# Patient Record
Sex: FEMALE | Race: BLACK OR AFRICAN AMERICAN | URBAN - METROPOLITAN AREA
[De-identification: names, ages, dates, MRNs, and addresses within clinical notes are randomized per-mention and may not be internally consistent; named-entity substitution may affect disease eponyms.]

---

## 2021-09-19 ENCOUNTER — TELEPHONE ENCOUNTER (OUTPATIENT)
Dept: URBAN - METROPOLITAN AREA CLINIC 74 | Facility: CLINIC | Age: 81
End: 2021-09-19

## 2021-09-19 ENCOUNTER — OUT OF OFFICE VISIT (OUTPATIENT)
Dept: URBAN - METROPOLITAN AREA MEDICAL CENTER 9 | Facility: MEDICAL CENTER | Age: 81
End: 2021-09-19
Payer: MEDICARE

## 2021-09-19 DIAGNOSIS — K86.89 ACUTE PANCREATIC FLUID COLLECTION: ICD-10-CM

## 2021-09-19 DIAGNOSIS — D64.89 ANEMIA DUE TO OTHER CAUSE: ICD-10-CM

## 2021-09-19 PROCEDURE — 99222 1ST HOSP IP/OBS MODERATE 55: CPT | Performed by: INTERNAL MEDICINE

## 2021-09-19 PROCEDURE — G8427 DOCREV CUR MEDS BY ELIG CLIN: HCPCS | Performed by: INTERNAL MEDICINE

## 2021-09-20 ENCOUNTER — OUT OF OFFICE VISIT (OUTPATIENT)
Dept: URBAN - METROPOLITAN AREA MEDICAL CENTER 9 | Facility: MEDICAL CENTER | Age: 81
End: 2021-09-20
Payer: MEDICARE

## 2021-09-20 DIAGNOSIS — K21.9 ACID REFLUX: ICD-10-CM

## 2021-09-20 DIAGNOSIS — K59.09 CHANGE IN BOWEL MOVEMENTS INTERMITTENT CONSTIPATION. URGENCY IN THE MORNING.: ICD-10-CM

## 2021-09-20 DIAGNOSIS — K86.89 ACUTE PANCREATIC FLUID COLLECTION: ICD-10-CM

## 2021-09-20 PROCEDURE — 99233 SBSQ HOSP IP/OBS HIGH 50: CPT | Performed by: INTERNAL MEDICINE

## 2021-09-23 ENCOUNTER — OFFICE VISIT (OUTPATIENT)
Dept: URBAN - METROPOLITAN AREA CLINIC 40 | Facility: CLINIC | Age: 81
End: 2021-09-23

## 2021-09-23 NOTE — HPI-TODAY'S VISIT:
81 yo female here for f/u after being discharged yesterday for abnormal CT. some fullness in te pancreatic head noted. was in the hospital with SDH after recent fall. plavix has been held. has h/o CAD s/p CABG.

## 2021-10-11 ENCOUNTER — OFFICE VISIT (OUTPATIENT)
Dept: URBAN - METROPOLITAN AREA CLINIC 40 | Facility: CLINIC | Age: 81
End: 2021-10-11

## 2021-10-14 ENCOUNTER — WEB ENCOUNTER (OUTPATIENT)
Dept: URBAN - METROPOLITAN AREA CLINIC 40 | Facility: CLINIC | Age: 81
End: 2021-10-14

## 2021-10-14 ENCOUNTER — OFFICE VISIT (OUTPATIENT)
Dept: URBAN - METROPOLITAN AREA CLINIC 40 | Facility: CLINIC | Age: 81
End: 2021-10-14
Payer: MEDICARE

## 2021-10-14 DIAGNOSIS — K83.8 DILATED BILE DUCT: ICD-10-CM

## 2021-10-14 DIAGNOSIS — K86.89 MASS OF PANCREAS: ICD-10-CM

## 2021-10-14 DIAGNOSIS — D64.9 ANEMIA: ICD-10-CM

## 2021-10-14 DIAGNOSIS — K86.89 DILATED PANCREATIC DUCT: ICD-10-CM

## 2021-10-14 DIAGNOSIS — K92.1 MELENA: ICD-10-CM

## 2021-10-14 PROBLEM — 123608004: Status: ACTIVE | Noted: 2021-10-14

## 2021-10-14 PROCEDURE — 99214 OFFICE O/P EST MOD 30 MIN: CPT | Performed by: INTERNAL MEDICINE

## 2021-10-14 RX ORDER — PAROXETINE HYDROCHLORIDE HEMIHYDRATE 10 MG/1
1 TABLET IN THE MORNING TABLET, FILM COATED ORAL ONCE A DAY
Status: ACTIVE | COMMUNITY

## 2021-10-14 RX ORDER — CETIRIZINE HYDROCHLORIDE 10 MG/1
1 TABLET TABLET, FILM COATED ORAL ONCE A DAY
Status: ACTIVE | COMMUNITY

## 2021-10-14 RX ORDER — CLOPIDOGREL 75 MG/1
1 TABLET TABLET, FILM COATED ORAL ONCE A DAY
Status: ACTIVE | COMMUNITY

## 2021-10-14 RX ORDER — VALSARTAN AND HYDROCHLOROTHIAZIDE 160; 12.5 MG/1; MG/1
1 TABLET TABLET, FILM COATED ORAL ONCE A DAY
Status: ACTIVE | COMMUNITY

## 2021-10-14 RX ORDER — GABAPENTIN 300 MG/1
1 CAPSULE CAPSULE ORAL ONCE A DAY
Status: ACTIVE | COMMUNITY

## 2021-10-14 RX ORDER — OXYCODONE HYDROCHLORIDE AND ACETAMINOPHEN 5; 325 MG/1; MG/1
1 TABLET AS NEEDED TABLET ORAL
Status: ACTIVE | COMMUNITY

## 2021-10-14 RX ORDER — CARVEDILOL 6.25 MG/1
1 TABLET WITH FOOD TABLET, FILM COATED ORAL TWICE A DAY
Status: DISCONTINUED | COMMUNITY

## 2021-10-14 RX ORDER — PANTOPRAZOLE SODIUM 40 MG/1
1 TABLET TABLET, DELAYED RELEASE ORAL ONCE A DAY
Status: ACTIVE | COMMUNITY

## 2021-10-14 RX ORDER — METFORMIN HYDROCHLORIDE 1000 MG/1
1 TABLET WITH A MEAL TABLET, FILM COATED ORAL BID
Status: ACTIVE | COMMUNITY

## 2021-10-14 RX ORDER — ATORVASTATIN CALCIUM 40 MG/1
1 TABLET TABLET, FILM COATED ORAL ONCE A DAY
Status: ACTIVE | COMMUNITY

## 2021-10-14 NOTE — HPI-TODAY'S VISIT:
Hospital follow up. Since d/c a few weeks ago she has been noting black stools daily. Fatigue. Weakness. No overt pain. Not taking iron.  CROW MEADE is a 80 y.o. female with HTN, DM, HLD, CAD, S/P CABG & MVR in 05/2021, Mitral Regurgitation, Nephrolithiasis, PAH, PE (Distal left lower lobe pulmonary arteries), DDD, Chronic low back pain, Spinal Stenosis, DJD, S/P Knee surgery, Last Colonoscopy in 2008, Diverticulosis, Constipation, and OA admitted subsequent to falling.     CT: IMPRESSION: .         .   Low attenuation within the pancreatic head/uncinate process spanning about 2.4 x 2.6 cm. Intrahepatic biliary ductal dilation. Pancreatic ductal dilation. Distention of the gallbladder. This could be a pancreatic mass. MRI or endoscopic ultrasound could  be obtained to further characterize.   Pancolonic diverticulosis. No focal inflammation.   Bilateral nephrolithiasis. Large calculi in the left lower pole and left renal pelvis. Cortical scarring. No inflammatory changes. No hydronephrosis.   Postsurgical changes of sternotomy. Mitral prosthesis. Atherosclerosis with approximately 50% narrowing of the proximal SMA.   The Results Reporting Office (F1) will complete appropriate follow-up actions based on defined processes. F1   Released By: JOSH WHIPPLE MD 9/17/2021 9:31 PM MRCP; IMPRESSION: .         .   There is evidence for an abrupt termination of the common bile duct with smooth like tapering. This occurs at the pancreatic head region and pancreatic head mass is a primary consideration. MRI with pancreatic protocol to include diffusion-weighted  imaging and contrast is recommended to further characterize this pancreatic abnormality.   Distended gallbladder with evidence for sludge.   Diverticulosis left colon.   Atrophy of the left kidney with calcifications present.   Edited By: Flori Britt 9/18/2021 12:27 PM   MRI: IMPRESSION: .         .   Limited examination due to extensive motion artifact. The masslike region, in the vicinity of the pancreatic head/uncinate process is not clearly visualized or evaluated on this examination. Further assessment may be done with a dedicated pancreatic  protocol CT versus endoscopy ultrasound evaluation   Please see body of report for additional details   The Results Reporting Office (F1) will complete appropriate follow-up actions based on defined processes. F1   Released By: DEMETRI WILDE MD 9/20/2021 10:47 AM   Assessment; - Imaging with pancreatic mass with double ductal sign similar to imaging on 05/2021. - Low attenuation within the pancreatic head/uncinate process spanning about 2.4 x 2.6 cm. Intrahepatic biliary ductal dilation. Pancreatic ductal dilation. Distention of the gallbladder.  - Acute on Chronic Anemia  - Left Temporoparietal SDH  - Recurrent Fall - Left Wrist Fracture - CAD. S/P CABG/bio MVR on 05/05/2021  - [place]ATIANA - History of PE not on OAC - UTI - Lower abdominal cramping - Constipation - GERD - Long term use of NSAID's    Plan: - Labs and imaging reviewed.  - Monitor labs. - DVT prophylaxis: SCD's. - Received COVID vaccine. - Check tumor markers. - Pain management and antiemetic therapy per primary team. - Pantoprazole 40 mg once daily. - Miralax 17 g once daily. - Diet per primary team as tolerated.  - Appreciate Neurology inputs. - Consult [facility].  - Avoid NSAID's.  - The patient needs EUS with Dr. Ann as an outpatient for evaluation of abnormal findings of pancreatic mass on CT and MRI imaging.
General

## 2021-10-14 NOTE — PHYSICAL EXAM MUSCULOSKELETAL:
normal gait and station , no tenderness or deformities present Breath sounds clear and equal bilaterally.

## 2021-10-15 ENCOUNTER — TELEPHONE ENCOUNTER (OUTPATIENT)
Dept: URBAN - METROPOLITAN AREA CLINIC 74 | Facility: CLINIC | Age: 81
End: 2021-10-15

## 2021-10-15 ENCOUNTER — OUT OF OFFICE VISIT (OUTPATIENT)
Dept: URBAN - METROPOLITAN AREA MEDICAL CENTER 9 | Facility: MEDICAL CENTER | Age: 81
End: 2021-10-15
Payer: MEDICARE

## 2021-10-15 DIAGNOSIS — D50.9 ANEMIA: ICD-10-CM

## 2021-10-15 DIAGNOSIS — K57.32 DIVERTICULITIS LARGE INTESTINE: ICD-10-CM

## 2021-10-15 DIAGNOSIS — K92.1 ACUTE MELENA: ICD-10-CM

## 2021-10-15 DIAGNOSIS — K86.89 ACUTE PANCREATIC FLUID COLLECTION: ICD-10-CM

## 2021-10-15 PROCEDURE — 99222 1ST HOSP IP/OBS MODERATE 55: CPT | Performed by: PHYSICIAN ASSISTANT

## 2021-10-15 PROCEDURE — G8427 DOCREV CUR MEDS BY ELIG CLIN: HCPCS | Performed by: PHYSICIAN ASSISTANT

## 2021-10-16 ENCOUNTER — OUT OF OFFICE VISIT (OUTPATIENT)
Dept: URBAN - METROPOLITAN AREA MEDICAL CENTER 9 | Facility: MEDICAL CENTER | Age: 81
End: 2021-10-16
Payer: MEDICARE

## 2021-10-16 DIAGNOSIS — K82.8 ADENOMYOSIS OF GALLBLADDER: ICD-10-CM

## 2021-10-16 DIAGNOSIS — K86.89 ACUTE PANCREATIC FLUID COLLECTION: ICD-10-CM

## 2021-10-16 DIAGNOSIS — D64.89 ANEMIA DUE TO OTHER CAUSE: ICD-10-CM

## 2021-10-16 DIAGNOSIS — R10.30 ABDOMINAL PAIN OF UNKNOWN CAUSE: ICD-10-CM

## 2021-10-16 DIAGNOSIS — R19.5 ABNORMAL CONSISTENCY OF STOOL: ICD-10-CM

## 2021-10-16 PROCEDURE — 99232 SBSQ HOSP IP/OBS MODERATE 35: CPT | Performed by: STUDENT IN AN ORGANIZED HEALTH CARE EDUCATION/TRAINING PROGRAM

## 2021-11-08 ENCOUNTER — OFFICE VISIT (OUTPATIENT)
Dept: URBAN - METROPOLITAN AREA CLINIC 74 | Facility: CLINIC | Age: 81
End: 2021-11-08
Payer: MEDICARE

## 2021-11-08 ENCOUNTER — DASHBOARD ENCOUNTERS (OUTPATIENT)
Age: 81
End: 2021-11-08

## 2021-11-08 DIAGNOSIS — R93.5 ABNORMAL CT OF THE ABDOMEN: ICD-10-CM

## 2021-11-08 DIAGNOSIS — R10.30 LOWER ABDOMINAL PAIN: ICD-10-CM

## 2021-11-08 DIAGNOSIS — Z79.01 CHRONIC ANTICOAGULATION: ICD-10-CM

## 2021-11-08 DIAGNOSIS — D50.0 IRON DEFICIENCY ANEMIA DUE TO CHRONIC BLOOD LOSS: ICD-10-CM

## 2021-11-08 PROBLEM — 711150003: Status: ACTIVE | Noted: 2021-11-08

## 2021-11-08 PROBLEM — 724556004: Status: ACTIVE | Noted: 2021-11-08

## 2021-11-08 PROBLEM — 54586004: Status: ACTIVE | Noted: 2021-11-08

## 2021-11-08 PROCEDURE — 99205 OFFICE O/P NEW HI 60 MIN: CPT | Performed by: STUDENT IN AN ORGANIZED HEALTH CARE EDUCATION/TRAINING PROGRAM

## 2021-11-08 RX ORDER — METHOCARBAMOL 750 MG/1
1 TABLET TABLET, FILM COATED ORAL
Status: ACTIVE | COMMUNITY

## 2021-11-08 RX ORDER — CLOPIDOGREL BISULFATE 75 MG/1
1 TABLET TABLET ORAL ONCE A DAY
Status: ACTIVE | COMMUNITY

## 2021-11-08 RX ORDER — PANTOPRAZOLE SODIUM 40 MG/1
1 TABLET TABLET, DELAYED RELEASE ORAL ONCE A DAY
Status: ACTIVE | COMMUNITY

## 2021-11-08 RX ORDER — CARVEDILOL 3.12 MG/1
1 TABLET WITH FOOD TABLET, FILM COATED ORAL TWICE A DAY
Status: ACTIVE | COMMUNITY

## 2021-11-08 RX ORDER — DAPAGLIFLOZIN 5 MG/1
1 TABLET TABLET, FILM COATED ORAL ONCE A DAY
Status: ACTIVE | COMMUNITY

## 2021-11-08 RX ORDER — ASPIRIN 325 MG/1
1 TABLET TABLET, FILM COATED ORAL ONCE A DAY
Status: ACTIVE | COMMUNITY

## 2021-11-08 RX ORDER — OXYCODONE HYDROCHLORIDE AND ACETAMINOPHEN 5; 325 MG/1; MG/1
1 TABLET AS NEEDED TABLET ORAL
Status: ACTIVE | COMMUNITY

## 2021-11-08 RX ORDER — ATORVASTATIN CALCIUM 40 MG/1
1 TABLET TABLET, FILM COATED ORAL ONCE A DAY
Status: ACTIVE | COMMUNITY

## 2021-11-08 RX ORDER — POLYETHYLENE GLYCOL 3350, SODIUM SULFATE, SODIUM CHLORIDE, POTASSIUM CHLORIDE, ASCORBIC ACID, SODIUM ASCORBATE 140-9-5.2G
AS DIRECTED KIT ORAL ONCE
Qty: 1 KIT | Refills: 0 | OUTPATIENT
Start: 2021-11-08 | End: 2021-11-09

## 2021-11-08 RX ORDER — GABAPENTIN 300 MG/1
1 CAPSULE CAPSULE ORAL ONCE A DAY
Status: ACTIVE | COMMUNITY

## 2021-11-08 RX ORDER — PAROXETINE HYDROCHLORIDE HEMIHYDRATE 10 MG/1
1 TABLET IN THE MORNING TABLET, FILM COATED ORAL ONCE A DAY
Status: ACTIVE | COMMUNITY

## 2021-11-08 RX ORDER — DICYCLOMINE HYDROCHLORIDE 10 MG/1
2 CAPSULES CAPSULE ORAL THREE TIMES A DAY
Status: ACTIVE | COMMUNITY

## 2021-11-08 RX ORDER — PHENAZOPYRIDINE HYDROCHLORIDE 100 MG/1
2 TABLETS AFTER MEALS TABLET, FILM COATED ORAL THREE TIMES A DAY
Status: ACTIVE | COMMUNITY

## 2021-11-08 NOTE — HPI-TODAY'S VISIT:
81-year-old female Seen by me in the hospital and comes in for follow-up after recent discharge In the hospital patient was seen for lower abdomen pain.  Patient imaging was concerning for acute diverticulitis.  Patient was treated with antibiotics.  Patient in the hospital after 2 days of therapy felt better but currently says that at the time of discharge her symptoms were back.  Patient denies any diarrhea or blood or mucus in stool.  Reports intermittent constipation for which she has to use laxatives every other day with good response.  Patient is currently using mag citrate and Dulcolax. Patient also reports weight loss of about 50 pounds.  Mild nausea but no vomiting.  Sometimes does reports pain in the mid abdomen and in back which is not clear if related to GI etiology or not. No fever or chills.  No jaundice.  Poor appetite.  Pain is not well controlled.  Pain does not changes with meal intake or bowel movement. Currently on aspirin and Plavix.  Aspirin is dosed at 325 mg.  History of CABG surgery earlier this year.  No pacemaker or stents. Blood work notable for chronic anemia with concern for underlying iron deficiency. No overt signs of GI bleeding. Abdomen imaging with CT scan of abdomen and MRI of abdomen notable and concerning for fullness in the head of the pancreas but no definite mass reported. Last colonoscopy about 5 years back.